# Patient Record
Sex: FEMALE | Race: BLACK OR AFRICAN AMERICAN | Employment: UNEMPLOYED | URBAN - METROPOLITAN AREA
[De-identification: names, ages, dates, MRNs, and addresses within clinical notes are randomized per-mention and may not be internally consistent; named-entity substitution may affect disease eponyms.]

---

## 2019-01-27 ENCOUNTER — HOSPITAL ENCOUNTER (EMERGENCY)
Age: 64
Discharge: HOME | End: 2019-01-27
Attending: EMERGENCY MEDICINE
Payer: MEDICAID

## 2019-01-27 VITALS
BODY MASS INDEX: 51.91 KG/M2 | HEIGHT: 63 IN | WEIGHT: 293 LBS | OXYGEN SATURATION: 99 % | TEMPERATURE: 97.7 F | HEART RATE: 89 BPM | DIASTOLIC BLOOD PRESSURE: 65 MMHG | RESPIRATION RATE: 22 BRPM | SYSTOLIC BLOOD PRESSURE: 160 MMHG

## 2019-01-27 DIAGNOSIS — R42 DIZZINESS: Primary | ICD-10-CM

## 2019-01-27 LAB
ALBUMIN SERPL-MCNC: 3.2 G/DL (ref 3.5–5)
ALBUMIN/GLOB SERPL: 0.6 {RATIO} (ref 1.1–2.2)
ALP SERPL-CCNC: 81 U/L (ref 45–117)
ALT SERPL-CCNC: 13 U/L (ref 12–78)
ANION GAP SERPL CALC-SCNC: 9 MMOL/L (ref 5–15)
APPEARANCE UR: CLEAR
AST SERPL-CCNC: 8 U/L (ref 15–37)
BACTERIA URNS QL MICRO: NEGATIVE /HPF
BASOPHILS # BLD: 0 K/UL (ref 0–0.1)
BASOPHILS NFR BLD: 1 % (ref 0–1)
BILIRUB SERPL-MCNC: 0.4 MG/DL (ref 0.2–1)
BILIRUB UR QL: NEGATIVE
BUN SERPL-MCNC: 10 MG/DL (ref 6–20)
BUN/CREAT SERPL: 14 (ref 12–20)
CALCIUM SERPL-MCNC: 8.9 MG/DL (ref 8.5–10.1)
CHLORIDE SERPL-SCNC: 101 MMOL/L (ref 97–108)
CK SERPL-CCNC: 32 U/L (ref 26–192)
CO2 SERPL-SCNC: 28 MMOL/L (ref 21–32)
COLOR UR: NORMAL
CREAT SERPL-MCNC: 0.74 MG/DL (ref 0.55–1.02)
DIFFERENTIAL METHOD BLD: ABNORMAL
EOSINOPHIL # BLD: 0.2 K/UL (ref 0–0.4)
EOSINOPHIL NFR BLD: 2 % (ref 0–7)
EPITH CASTS URNS QL MICRO: NORMAL /LPF
ERYTHROCYTE [DISTWIDTH] IN BLOOD BY AUTOMATED COUNT: 17.1 % (ref 11.5–14.5)
GLOBULIN SER CALC-MCNC: 5.4 G/DL (ref 2–4)
GLUCOSE SERPL-MCNC: 110 MG/DL (ref 65–100)
GLUCOSE UR STRIP.AUTO-MCNC: NEGATIVE MG/DL
HCT VFR BLD AUTO: 37.9 % (ref 35–47)
HGB BLD-MCNC: 12 G/DL (ref 11.5–16)
HGB UR QL STRIP: NEGATIVE
HYALINE CASTS URNS QL MICRO: NORMAL /LPF (ref 0–5)
IMM GRANULOCYTES # BLD AUTO: 0 K/UL (ref 0–0.04)
IMM GRANULOCYTES NFR BLD AUTO: 0 % (ref 0–0.5)
KETONES UR QL STRIP.AUTO: NEGATIVE MG/DL
LEUKOCYTE ESTERASE UR QL STRIP.AUTO: NEGATIVE
LYMPHOCYTES # BLD: 3.7 K/UL (ref 0.8–3.5)
LYMPHOCYTES NFR BLD: 47 % (ref 12–49)
MCH RBC QN AUTO: 25.4 PG (ref 26–34)
MCHC RBC AUTO-ENTMCNC: 31.7 G/DL (ref 30–36.5)
MCV RBC AUTO: 80.1 FL (ref 80–99)
MONOCYTES # BLD: 0.5 K/UL (ref 0–1)
MONOCYTES NFR BLD: 6 % (ref 5–13)
NEUTS SEG # BLD: 3.4 K/UL (ref 1.8–8)
NEUTS SEG NFR BLD: 44 % (ref 32–75)
NITRITE UR QL STRIP.AUTO: NEGATIVE
NRBC # BLD: 0 K/UL (ref 0–0.01)
NRBC BLD-RTO: 0 PER 100 WBC
PH UR STRIP: 6.5 [PH] (ref 5–8)
PLATELET # BLD AUTO: 317 K/UL (ref 150–400)
PMV BLD AUTO: 10.3 FL (ref 8.9–12.9)
POTASSIUM SERPL-SCNC: 3.8 MMOL/L (ref 3.5–5.1)
PROT SERPL-MCNC: 8.6 G/DL (ref 6.4–8.2)
PROT UR STRIP-MCNC: NEGATIVE MG/DL
RBC # BLD AUTO: 4.73 M/UL (ref 3.8–5.2)
RBC #/AREA URNS HPF: NORMAL /HPF (ref 0–5)
SODIUM SERPL-SCNC: 138 MMOL/L (ref 136–145)
SP GR UR REFRACTOMETRY: <1.005 (ref 1–1.03)
TROPONIN I SERPL-MCNC: <0.05 NG/ML
UA: UC IF INDICATED,UAUC: NORMAL
UROBILINOGEN UR QL STRIP.AUTO: 0.2 EU/DL (ref 0.2–1)
WBC # BLD AUTO: 7.9 K/UL (ref 3.6–11)
WBC URNS QL MICRO: NORMAL /HPF (ref 0–4)

## 2019-01-27 PROCEDURE — 85025 COMPLETE CBC W/AUTO DIFF WBC: CPT

## 2019-01-27 PROCEDURE — 99285 EMERGENCY DEPT VISIT HI MDM: CPT

## 2019-01-27 PROCEDURE — 84484 ASSAY OF TROPONIN QUANT: CPT

## 2019-01-27 PROCEDURE — 82550 ASSAY OF CK (CPK): CPT

## 2019-01-27 PROCEDURE — 81001 URINALYSIS AUTO W/SCOPE: CPT

## 2019-01-27 PROCEDURE — 80053 COMPREHEN METABOLIC PANEL: CPT

## 2019-01-27 PROCEDURE — 36415 COLL VENOUS BLD VENIPUNCTURE: CPT

## 2019-01-28 NOTE — ED TRIAGE NOTES
Assumed care of pt from EMS. Pt reports having diarrhea since yesterday and feeling like she was going to pass out today. Pt reports she drank 3 bottles of water today but she thinks she may dehydrated. PT reports no vomiting and no chest pain.

## 2019-01-28 NOTE — ED NOTES
Dr. Sugey Phoenix reviewed discharge instructions with the patient. The patient verbalized understanding. All questions and concerns were addressed. The patient declined a wheelchair and is discharged ambulatory in the care of family members with instructions and prescriptions in hand. Pt is alert and oriented x 4. Respirations are clear and unlabored.

## 2019-01-28 NOTE — ED NOTES
Dr. Jennifer Lanes reviewed discharge instructions with the patient. The patient verbalized understanding. All questions and concerns were addressed. The patient declined a wheelchair and is discharged ambulatory in the care of family members with instructions and prescriptions in hand. Pt is alert and oriented x 4. Respirations are clear and unlabored.

## 2019-01-28 NOTE — ED PROVIDER NOTES
EMERGENCY DEPARTMENT HISTORY AND PHYSICAL EXAM 
 
 
Date: 1/27/2019 Patient Name: Celine Clark History of Presenting Illness Chief Complaint Patient presents with  Dizziness  Diarrhea History Provided By: Patient HPI: Celine Clark, 66 y.o. female with PMHx significant for HTN, asthma, arthritis, presents via EMS to the ED with cc of improving lightheadedness, diarrhea, and urinary frequency onset yesterday. Pt reports that for the past few days, her BP has been elevated. She reports being dx with URI in late December and was given a 5 day course of abx which she finished; however, her cough has persisted. She reports that today, she had near syncope. She denies CP, SOB, abd pain or BLE swelling. She denies hx of DM. She denies any alleviating factors. There are no other complaints, changes, or physical findings at this time. PCP: Gabriel Weinstein MD 
 
Social Hx:  
Social History Tobacco Use Smoking Status Former Smoker Smokeless Tobacco Never Used Tobacco Comment  
 quit smoking at age 34  
, Social History Substance and Sexual Activity Alcohol Use No  
,  
Social History Substance and Sexual Activity Drug Use No  
 Comment: denies Past History Past Surgical History: No past surgical history on file. Family History: 
Family History Problem Relation Age of Onset  Asthma Mother  Hypertension Mother  High Cholesterol Mother  Emphysema Brother  Heart Failure Brother  Colon Cancer Brother  Hypertension Brother  Lung Disease Brother   
     copd  Heart Disease Brother   
     chf  
 Diabetes Maternal Uncle  Hypertension Maternal Uncle  Diabetes Paternal Aunt  Hypertension Paternal Uncle  High Cholesterol Paternal Uncle  Colon Cancer Maternal Grandmother  Hypertension Maternal Grandfather Allergies: Allergies Allergen Reactions  Latex Rash and Itching [address]  Ibuprofen Hives  Pcn [Penicillins] Hives  Shellfish Derived Hives  Solu-Medrol W/Diluent Hives  Sulfa Dyne Hives Review of Systems Review of Systems Constitutional: Negative for fatigue and fever. HENT: Negative. Eyes: Negative. Respiratory: Positive for cough. Negative for shortness of breath and wheezing. Cardiovascular: Negative for chest pain and leg swelling. Gastrointestinal: Positive for diarrhea. Negative for blood in stool, constipation, nausea and vomiting. Endocrine: Negative. Genitourinary: Positive for frequency. Negative for difficulty urinating and dysuria. Musculoskeletal: Negative. Skin: Negative for rash. Allergic/Immunologic: Negative. Neurological: Positive for syncope (near) and light-headedness. Negative for weakness and numbness. Hematological: Negative. Psychiatric/Behavioral: Negative. Physical Exam  
Physical Exam  
Constitutional: She is oriented to person, place, and time. She appears well-developed and well-nourished. No distress. Elevated BMI HENT:  
Head: Normocephalic and atraumatic. Mouth/Throat: Oropharynx is clear and moist.  
Eyes: Conjunctivae and EOM are normal.  
Neck: Neck supple. No JVD present. No tracheal deviation present. Cardiovascular: Regular rhythm and intact distal pulses. Tachycardia present. Exam reveals no gallop and no friction rub. No murmur heard. Pulmonary/Chest: Effort normal and breath sounds normal. No stridor. No respiratory distress. She has no wheezes. Abdominal: Soft. Bowel sounds are normal. She exhibits no distension and no mass. There is no tenderness. There is no guarding. Musculoskeletal: Normal range of motion. She exhibits no edema or tenderness. No deformity. No peripheral edema. Neurological: She is alert and oriented to person, place, and time. She has normal strength. No focal deficits Skin: Skin is warm, dry and intact. No rash noted. Psychiatric: She has a normal mood and affect. Her behavior is normal. Judgment and thought content normal.  
Nursing note and vitals reviewed. Diagnostic Study Results Labs - Recent Results (from the past 12 hour(s)) CBC WITH AUTOMATED DIFF Collection Time: 01/27/19  7:40 PM  
Result Value Ref Range WBC 7.9 3.6 - 11.0 K/uL  
 RBC 4.73 3.80 - 5.20 M/uL  
 HGB 12.0 11.5 - 16.0 g/dL HCT 37.9 35.0 - 47.0 % MCV 80.1 80.0 - 99.0 FL  
 MCH 25.4 (L) 26.0 - 34.0 PG  
 MCHC 31.7 30.0 - 36.5 g/dL  
 RDW 17.1 (H) 11.5 - 14.5 % PLATELET 045 378 - 685 K/uL MPV 10.3 8.9 - 12.9 FL  
 NRBC 0.0 0  WBC ABSOLUTE NRBC 0.00 0.00 - 0.01 K/uL NEUTROPHILS 44 32 - 75 % LYMPHOCYTES 47 12 - 49 % MONOCYTES 6 5 - 13 % EOSINOPHILS 2 0 - 7 % BASOPHILS 1 0 - 1 % IMMATURE GRANULOCYTES 0 0.0 - 0.5 % ABS. NEUTROPHILS 3.4 1.8 - 8.0 K/UL  
 ABS. LYMPHOCYTES 3.7 (H) 0.8 - 3.5 K/UL  
 ABS. MONOCYTES 0.5 0.0 - 1.0 K/UL  
 ABS. EOSINOPHILS 0.2 0.0 - 0.4 K/UL  
 ABS. BASOPHILS 0.0 0.0 - 0.1 K/UL  
 ABS. IMM. GRANS. 0.0 0.00 - 0.04 K/UL  
 DF AUTOMATED    
TROPONIN I Collection Time: 01/27/19  7:40 PM  
Result Value Ref Range Troponin-I, Qt. <0.05 <0.05 ng/mL CK W/ REFLX CKMB Collection Time: 01/27/19  7:40 PM  
Result Value Ref Range CK 32 26 - 331 U/L  
METABOLIC PANEL, COMPREHENSIVE Collection Time: 01/27/19  7:40 PM  
Result Value Ref Range Sodium 138 136 - 145 mmol/L Potassium 3.8 3.5 - 5.1 mmol/L Chloride 101 97 - 108 mmol/L  
 CO2 28 21 - 32 mmol/L Anion gap 9 5 - 15 mmol/L Glucose 110 (H) 65 - 100 mg/dL BUN 10 6 - 20 MG/DL Creatinine 0.74 0.55 - 1.02 MG/DL  
 BUN/Creatinine ratio 14 12 - 20 GFR est AA >60 >60 ml/min/1.73m2 GFR est non-AA >60 >60 ml/min/1.73m2 Calcium 8.9 8.5 - 10.1 MG/DL  Bilirubin, total 0.4 0.2 - 1.0 MG/DL  
 ALT (SGPT) 13 12 - 78 U/L  
 AST (SGOT) 8 (L) 15 - 37 U/L Alk. phosphatase 81 45 - 117 U/L Protein, total 8.6 (H) 6.4 - 8.2 g/dL Albumin 3.2 (L) 3.5 - 5.0 g/dL Globulin 5.4 (H) 2.0 - 4.0 g/dL A-G Ratio 0.6 (L) 1.1 - 2.2 URINALYSIS W/ REFLEX CULTURE Collection Time: 01/27/19  8:26 PM  
Result Value Ref Range Color YELLOW/STRAW Appearance CLEAR CLEAR Specific gravity <1.005 1.003 - 1.030  
 pH (UA) 6.5 5.0 - 8.0 Protein NEGATIVE  NEG mg/dL Glucose NEGATIVE  NEG mg/dL Ketone NEGATIVE  NEG mg/dL Bilirubin NEGATIVE  NEG Blood NEGATIVE  NEG Urobilinogen 0.2 0.2 - 1.0 EU/dL Nitrites NEGATIVE  NEG Leukocyte Esterase NEGATIVE  NEG    
 WBC 0-4 0 - 4 /hpf  
 RBC 0-5 0 - 5 /hpf Epithelial cells FEW FEW /lpf Bacteria NEGATIVE  NEG /hpf  
 UA:UC IF INDICATED CULTURE NOT INDICATED BY UA RESULT CNI Hyaline cast 0-2 0 - 5 /lpf Radiologic Studies - No orders to display CT Results  (Last 48 hours) None CXR Results  (Last 48 hours) None Medical Decision Making I am the first provider for this patient. I reviewed the vital signs, available nursing notes, past medical history, past surgical history, family history and social history. Vital Signs-Reviewed the patient's vital signs. Patient Vitals for the past 12 hrs: 
 Temp Pulse Resp BP SpO2  
01/27/19 2100  89 22 160/65 99 % 01/27/19 2045  94 21 159/76 100 % 01/27/19 2030  94 21 158/76 99 % 01/27/19 2019  (!) 107 27 160/88 99 % 01/27/19 2018  (!) 101 23 160/88 100 % 01/27/19 1937 97.7 °F (36.5 °C) (!) 115 26 (!) 173/107 99 % Pulse Oximetry Analysis - 99% on RA Records Reviewed: Nursing Notes, Old Medical Records, Previous Radiology Studies and Previous Laboratory Studies Provider Notes (Medical Decision Making): Pt presenting with transient episode of lightheadedness that is now resolved. Pt had high BP at home but has now normalized.  Pt has no complaints. Will check labs. DDx: UA, ACS, arrhythmia, anemia, metabolic abnormality, dehydration, NAWAF. ED Course:  
Initial assessment performed. The patients presenting problems have been discussed, and they are in agreement with the care plan formulated and outlined with them. I have encouraged them to ask questions as they arise throughout their visit. Critical Care Time:  
0 minutes. Disposition: 
Discharge Note: 
9:45 PM 
The patient has been re-evaluated and is ready for discharge. Reviewed available results with patient. Counseled patient/parent/guardian on diagnosis and care plan. Patient has expressed understanding, and all questions have been answered. Patient agrees with plan and agrees to follow up as recommended, or return to the ED if their symptoms worsen. Discharge instructions have been provided and explained to the patient, along with reasons to return to the ED. PLAN: 
1. Current Discharge Medication List  
  
 
2. Follow-up Information Follow up With Specialties Details Why Contact Info Amairani Harris MD Family [facility] Schedule an appointment as soon as possible for a visit  [address] [phone] 
[phone] [facility] EMERGENCY DEPT Emergency Medicine  As needed, If symptoms worsen [address] 
[phone] Return to ED if worse Diagnosis Clinical Impression: 1. Dizziness Attestations: This note is prepared by Genesis Lee, acting as scribe for DO Wilfredo Nicole DO: The scribe's documentation has been prepared under my direction and personally reviewed by me in its entirety. I confirm that the note above accurately reflects all work, treatment, procedures, and medical decision making performed by me.

## 2019-01-28 NOTE — DISCHARGE INSTRUCTIONS

## 2019-02-21 ENCOUNTER — HOSPITAL ENCOUNTER (EMERGENCY)
Age: 64
Discharge: HOME | End: 2019-02-22
Attending: EMERGENCY MEDICINE
Payer: MEDICAID

## 2019-02-21 VITALS
HEART RATE: 110 BPM | TEMPERATURE: 98 F | SYSTOLIC BLOOD PRESSURE: 169 MMHG | BODY MASS INDEX: 51.91 KG/M2 | OXYGEN SATURATION: 100 % | WEIGHT: 293 LBS | DIASTOLIC BLOOD PRESSURE: 90 MMHG | RESPIRATION RATE: 16 BRPM | HEIGHT: 63 IN

## 2019-02-21 DIAGNOSIS — I10 ACCELERATED HYPERTENSION: ICD-10-CM

## 2019-02-21 DIAGNOSIS — T18.9XXA SWALLOWED FOREIGN BODY, INITIAL ENCOUNTER: Primary | ICD-10-CM

## 2019-02-21 DIAGNOSIS — R13.19 ESOPHAGEAL DYSPHAGIA: ICD-10-CM

## 2019-02-21 PROCEDURE — 74011250637 HC RX REV CODE- 250/637: Performed by: STUDENT IN AN ORGANIZED HEALTH CARE EDUCATION/TRAINING PROGRAM

## 2019-02-21 PROCEDURE — 99283 EMERGENCY DEPT VISIT LOW MDM: CPT

## 2019-02-21 PROCEDURE — 74011000250 HC RX REV CODE- 250: Performed by: STUDENT IN AN ORGANIZED HEALTH CARE EDUCATION/TRAINING PROGRAM

## 2019-02-21 RX ORDER — MAG HYDROX/ALUMINUM HYD/SIMETH 200-200-20
30 SUSPENSION, ORAL (FINAL DOSE FORM) ORAL
Qty: 30 ML | Refills: 0 | Status: SHIPPED | OUTPATIENT
Start: 2019-02-21

## 2019-02-21 RX ADMIN — LIDOCAINE HYDROCHLORIDE 40 ML: 20 SOLUTION ORAL; TOPICAL at 23:14

## 2019-02-22 NOTE — ED NOTES
Dr. Romo Homes reviewed discharge instructions with the patient. The patient verbalized understanding. All questions and concerns were addressed. The patient declined a wheelchair and is discharged ambulatory in the care of family members with instructions and prescriptions in hand. Pt is alert and oriented x 4. Respirations are clear and unlabored.

## 2019-02-22 NOTE — ED PROVIDER NOTES
EMERGENCY DEPARTMENT HISTORY AND PHYSICAL EXAM 
 
 
Date: 2/21/2019 Patient Name: Nathan Parsons History of Presenting Illness Chief Complaint Patient presents with  Airway obstruction History Provided By: Patient and Patient's  HPI: Nathan Parsons, 66 y.o. female with PMHx significant for arthritis, asthma, HTN, morbid obesity, presents ambulatory to the ED with cc of swallowing a piece of fork. Patient states just PTA, she accidentally bit off the prong on a plastic fork and swallowed it. States right after she felt a scratchy feeling in her throat, so she presented to the ED. Denies SOB. States she definitely feels like she swallowed it. Denies hemoptysis. States she feels the need to cough because of the scratchiness in her throat. There are no other complaints, changes, or physical findings at this time. PCP: Ronda Byrd NP No current facility-administered medications on file prior to encounter. Current Outpatient Medications on File Prior to Encounter Medication Sig Dispense Refill  montelukast (SINGULAIR) 10 mg tablet TAKE 1 TABLET BY MOUTH EVERY DAY 30 Tab 9  
 valsartan (DIOVAN) 320 mg tablet TAKE 1 TABLET BY MOUTH EVERY DAY 90 Tab 2  
 valsartan (DIOVAN) 320 mg tablet TAKE 1 TABLET BY MOUTH EVERY DAY 90 Tab 2  
 montelukast (SINGULAIR) 10 mg tablet TAKE 1 TABLET BY MOUTH EVERY DAY 30 Tab 9  
 PROAIR HFA 90 mcg/actuation inhaler INHALE 2 PUFFS BY MOUTH EVERY 6 HOURS AS NEEDED 1 Inhaler 12  
 valsartan (DIOVAN) 320 mg tablet TAKE 1 TABLET BY MOUTH EVERY DAY 90 Tab 3  
 valsartan (DIOVAN) 320 mg tablet TAKE 1 TABLET BY MOUTH EVERY DAY 90 Tab 3  
 amLODIPine-benazepril (LOTREL) 10-20 mg per capsule Take 1 capsule by mouth daily. 90 capsule 1  
 levofloxacin (LEVAQUIN) 750 mg tablet Take 1 Tab by mouth daily. 10 Tab 0  
 meclizine (ANTIVERT) 25 mg tablet Take 1 Tab by mouth three (3) times daily as needed for Dizziness.  20 Tab 0  
  ondansetron (ZOFRAN ODT) 4 mg disintegrating tablet Take 1 Tab by mouth every eight (8) hours as needed for Nausea. 10 Tab 0  chlorpheniramine-HYDROcodone (TUSSIONEX) 8-10 mg/5 mL suspension TAKE 5 MLS EVERY 12 HOURS AS NEEDED FOR COUGH 60 mL 0  
 albuterol (PROVENTIL VENTOLIN) 2.5 mg /3 mL (0.083 %) nebulizer solution Use 1 vial via nebulizer 1 Package 0  
 acetaminophen-codeine (TYLENOL-CODEINE #3) 300-30 mg per tablet Take 1 Tab by mouth every six (6) hours as needed for Pain. 90 Tab 2  
 albuterol (PROVENTIL HFA, VENTOLIN HFA) 90 mcg/actuation inhaler Take 2 Puffs by inhalation every six (6) hours as needed. 1 Inhaler 0  Bedside Commode XX Used as needed 1 Each 0  
 beclomethasone (QVAR) 40 mcg/actuation inhaler Take 1 Puff by inhalation two (2) times a day. 1 Inhaler 6  Inhalational Spacing Device (SIDESTREAM PEDIATRIC FACE MASK) Kit 1 Units by Does Not Apply route as needed. 1 Units 0 Past History Past Medical History: 
Past Medical History:  
Diagnosis Date  Arthritis  Asthma  Chronic pain   
 knees & hips  Hypertension  Unspecified adverse effect of anesthesia 1st cousin had severe HTN and difficulty breathing  Unspecified sleep apnea   
 does not use c-pap- to be retested Past Surgical History: No past surgical history on file. Family History: 
Family History Problem Relation Age of Onset  Asthma Mother  Hypertension Mother  High Cholesterol Mother  Emphysema Brother  Heart Failure Brother  Colon Cancer Brother  Hypertension Brother  Lung Disease Brother   
     copd  Heart Disease Brother   
     chf  
 Diabetes Maternal Uncle  Hypertension Maternal Uncle  Diabetes Paternal Aunt  Hypertension Paternal Uncle  High Cholesterol Paternal Uncle  Colon Cancer Maternal Grandmother  Hypertension Maternal Grandfather Social History: 
Social History Tobacco Use  
  Smoking status: Former Smoker  Smokeless tobacco: Never Used  Tobacco comment: quit smoking at age 34 Substance Use Topics  Alcohol use: No  
 Drug use: No  
  Comment: denies Allergies: Allergies Allergen Reactions  Latex Rash and Itching [address]  Ibuprofen Hives  Pcn [Penicillins] Hives  Shellfish Derived Hives  Solu-Medrol W/Diluent Hives  Sulfa Dyne Hives Review of Systems Review of Systems Constitutional: Negative for chills and fever. HENT: Negative for congestion, postnasal drip, rhinorrhea and voice change. Eyes: Negative for visual disturbance. Respiratory: Positive for cough. Negative for chest tightness and shortness of breath. Cardiovascular: Negative for chest pain, palpitations and leg swelling. Gastrointestinal: Negative for abdominal distention, abdominal pain, diarrhea, nausea and vomiting. Genitourinary: Negative for dysuria, frequency, hematuria and urgency. Musculoskeletal: Negative for arthralgias, myalgias and neck pain. Skin: Negative for color change and rash. Neurological: Negative for dizziness, syncope, numbness and headaches. Psychiatric/Behavioral: Negative for agitation. Physical Exam  
Physical Exam  
Constitutional: She is oriented to person, place, and time. She appears well-developed and well-nourished. No distress. HENT:  
Head: Normocephalic and atraumatic. Mouth/Throat: Oropharynx is clear and moist.  
Eyes: Conjunctivae and EOM are normal. Pupils are equal, round, and reactive to light. Neck: Normal range of motion. Cardiovascular: Regular rhythm and normal heart sounds. Tachycardia present. Exam reveals no gallop and no friction rub. No murmur heard. Pulmonary/Chest: Effort normal and breath sounds normal. No respiratory distress. She has no wheezes. She has no rales. She exhibits no tenderness. Abdominal: Soft. Bowel sounds are normal. There is no tenderness. There is no rebound and no guarding. Musculoskeletal: Normal range of motion. She exhibits no edema, tenderness or deformity. Neurological: She is alert and oriented to person, place, and time. Skin: Skin is warm and dry. No rash noted. Psychiatric: She has a normal mood and affect. Diagnostic Study Results Labs - No results found for this or any previous visit (from the past 12 hour(s)). Radiologic Studies - No orders to display CT Results  (Last 48 hours) None CXR Results  (Last 48 hours) None Medical Decision Making I am the first provider for this patient. I reviewed the vital signs, available nursing notes, past medical history, past surgical history, family history and social history. Vital Signs-Reviewed the patient's vital signs. Patient Vitals for the past 12 hrs: 
 Temp Pulse Resp BP SpO2  
02/21/19 2236 98 °F (36.7 °C) (!) 110 16 169/90 100 % Pulse Oximetry Analysis - 100% on RA Cardiac Monitor:  
Rate: 110 bpm 
Rhythm: Normal Sinus Rhythm Records Reviewed: Nursing Notes and Old Medical Records Provider Notes (Medical Decision Making):  
Patient is a 63yo female who presents to the ED after swallowing the prong off a plastic fork. Patient appears well with no respiratory distress, do not believe imaging required at this time. Believe this should pass on its own. Will try GI cocktail for scratchy throat and discharge home. ED Course:  
Initial assessment performed. The patients presenting problems have been discussed, and they are in agreement with the care plan formulated and outlined with them. I have encouraged them to ask questions as they arise throughout their visit. Disposition: Foreign Body Ingestion, Improved PLAN: 
1. Discharge home Discharge Medication List as of 2/21/2019 11:32 PM  
  
START taking these medications Details alum-mag hydroxide-simeth (MYLANTA) 200-200-20 mg/5 mL susp Take 30 mL by mouth every four (4) hours as needed., Normal, Disp-30 mL, R-0  
  
  
CONTINUE these medications which have NOT CHANGED Details  
!! montelukast (SINGULAIR) 10 mg tablet TAKE 1 TABLET BY MOUTH EVERY DAY, Normal, Disp-30 Tab, R-9  
  
!! valsartan (DIOVAN) 320 mg tablet TAKE 1 TABLET BY MOUTH EVERY DAY, Normal, Disp-90 Tab, R-2  
  
!! valsartan (DIOVAN) 320 mg tablet TAKE 1 TABLET BY MOUTH EVERY DAY, Normal, Disp-90 Tab, R-2  
  
!! montelukast (SINGULAIR) 10 mg tablet TAKE 1 TABLET BY MOUTH EVERY DAY, Normal, Disp-30 Tab, R-9  
  
!! PROAIR HFA 90 mcg/actuation inhaler INHALE 2 PUFFS BY MOUTH EVERY 6 HOURS AS NEEDED, Normal, Disp-1 Inhaler, R-12  
  
!! valsartan (DIOVAN) 320 mg tablet TAKE 1 TABLET BY MOUTH EVERY DAY, Normal, Disp-90 Tab, R-3  
  
!! valsartan (DIOVAN) 320 mg tablet TAKE 1 TABLET BY MOUTH EVERY DAY, Normal, Disp-90 Tab, R-3  
  
amLODIPine-benazepril (LOTREL) 10-20 mg per capsule Take 1 capsule by mouth daily. , Normal, Disp-90 capsule, R-1  
  
levofloxacin (LEVAQUIN) 750 mg tablet Take 1 Tab by mouth daily. , Print, Disp-10 Tab, R-0  
  
meclizine (ANTIVERT) 25 mg tablet Take 1 Tab by mouth three (3) times daily as needed for Dizziness. , Print, Disp-20 Tab, R-0  
  
ondansetron (ZOFRAN ODT) 4 mg disintegrating tablet Take 1 Tab by mouth every eight (8) hours as needed for Nausea. , Print, Disp-10 Tab, R-0  
  
chlorpheniramine-HYDROcodone (TUSSIONEX) 8-10 mg/5 mL suspension TAKE 5 MLS EVERY 12 HOURS AS NEEDED FOR COUGH, Print, Disp-60 mL, R-0  
  
albuterol (PROVENTIL VENTOLIN) 2.5 mg /3 mL (0.083 %) nebulizer solution Use 1 vial via nebulizer, No Print, Disp-1 Package, R-0  
  
acetaminophen-codeine (TYLENOL-CODEINE #3) 300-30 mg per tablet Take 1 Tab by mouth every six (6) hours as needed for Pain., Print, Disp-90 Tab, R-2  
  
!! albuterol (PROVENTIL HFA, VENTOLIN HFA) 90 mcg/actuation inhaler Take 2 Puffs by inhalation every six (6) hours as needed., Normal, Disp-1 Inhaler, R-0 Bedside Commode XX Used as needed, Print, Disp-1 Each, R-0  
  
beclomethasone (QVAR) 40 mcg/actuation inhaler Take 1 Puff by inhalation two (2) times a day. Normal, 1 Puff, Disp-1 Inhaler, R-6 Inhalational Spacing Device (SIDESTREAM PEDIATRIC FACE MASK) Kit 1 Units by Does Not Apply route as needed. Print, 1 Units, Disp-1 Units, R-0  
  
 !! - Potential duplicate medications found. Please discuss with provider. 2.  
Follow-up Information Follow up With Specialties Details Why Contact Info Bard Yaya NP Nurse Practitioner In 1 week As needed, If symptoms worsen [address] [address], [facility] 
[phone] [facility] EMERGENCY DEPT Emergency Medicine In 1 day As needed, If symptoms worsen [address] [facility] 
[phone] Return to ED if worse Diagnosis Clinical Impression: 1. Swallowed foreign body, initial encounter 2. Esophageal dysphagia 3. Accelerated hypertension Attestations: 
Attending [place], 66 y.o. female with PMHx significant for arthritis, asthma, HTN, morbid obesity, presents ambulatory to the ED with cc of swallowing a piece of fork. Patient states just PTA, she accidentally bit off the prong on a plastic fork and swallowed it. General: NAD HEENT: EOMI, non-icteric sclera Chest: RRR, no m/r/g Lungs: CTAB Abd: soft, nondistended, nontender, no CVAT, +bs. No guarding or rebound. Ext: no peripheral edema, no cyanosis, +2 peripheral pulses Skin: no rashes or lesions Neuro: no focal deficits Impression/Plan: 
66 y.o. female presenting with swallowed foreign body (small piece of plastic fork), pt well appearing, no resp distress, tolerating secretions and able to tolerate PO in ED without issues; will dc home with symptomatic treatment and GI referral. 
 
HYPERTENSION COUNSELING 
 Education was provided to the patient today regarding their hypertension. Patient is made aware of their elevated blood pressure and is instructed to follow up this week with their Primary Care for a recheck. Patient is counseled regarding consequences of chronic, uncontrolled hypertension including kidney disease, heart disease, stroke or even death. Patient states their understanding and agrees to follow up this week. Additionally, during their visit, I discussed sodium restriction, maintaining ideal body weight and regular exercise program as physiologic means to achieve blood pressure control. The patient will strive towards this.  
  
I have personally seen and examined the patient; I have reviewed the resident's note and agree with findings and plan. I was present during the key portions of separately billed procedures and involved in all portions of critical care management. Yulissa Samuel MD 
 
 
This note will not be viewable in [address]

## 2019-02-22 NOTE — DISCHARGE INSTRUCTIONS
Patient Education        Nontoxic Ingestion: Care Instructions  Your Care Instructions  You swallowed something that is not a poison, or toxin. In most cases this will not cause problems. Your body will pass what you ate or drank. Drinking plenty of fluids and eating foods with fiber will help. Liquid charcoal may be given to a person who has swallowed a nontoxic substance. If you were treated with liquid charcoal, expect your stools to be black for a couple of days. The doctor may give you instructions for how to treat other side effects of charcoal, such as nausea and constipation. The doctor has checked you carefully. But problems can develop later. If you notice any problems or new symptoms, get medical treatment right away. Follow-up care is a key part of your treatment and safety. Be sure to make and go to all appointments, and call your doctor if you are having problems. It's also a good idea to know your test results and keep a list of the medicines you take. How can you care for yourself at home? · Follow your doctor's instructions about closely watching your health and behavior. · Drink plenty of fluids. If you have kidney, heart, or liver disease and have to limit fluids, talk with your doctor before you increase the amount of fluids you drink. · Include fruits, vegetables, beans, and whole grains in your diet each day. These foods are high in fiber. · If your doctor tells you to, take a fiber supplement, such as Citrucel or Metamucil, every day if needed. Read and follow all instructions on the label. · If liquid charcoal causes constipation, talk to your doctor about how to treat it. When should you call for help? Call 911 anytime you think you may need emergency care.  For example, call if:    · You passed out (lost consciousness).     · You are confused or can't think clearly.    Watch closely for changes in your health, and be sure to contact your doctor if:    · You do not get better as expected. Where can you learn more? Go to http://anne-marie-abdirahman.info/. Enter I479 in the search box to learn more about \"Nontoxic Ingestion: Care Instructions. \"  Current as of: March 27, 2018  Content Version: 11.9  © 6952-9853 [employer]. Care instructions adapted under license by [employer] (which disclaims liability or warranty for this information). If you have questions about a medical condition or this instruction, always ask your healthcare professional. [address] any warranty or liability for your use of this information.

## 2019-02-22 NOTE — ED TRIAGE NOTES
Assumed care of pt from EMS. Pt reports when she was eating around 3 hours ago that a plastic fork broke when she was eating. Pt reports the fork is stuck in her throat. Dr. Kobe Caceres at bedside. Pt reports no trouble breathing and no shortness of breath.

## 2019-04-02 ENCOUNTER — APPOINTMENT (OUTPATIENT)
Dept: GENERAL RADIOLOGY | Age: 64
End: 2019-04-02
Attending: EMERGENCY MEDICINE
Payer: MEDICAID

## 2019-04-02 ENCOUNTER — HOSPITAL ENCOUNTER (EMERGENCY)
Age: 64
Discharge: HOME | End: 2019-04-02
Attending: EMERGENCY MEDICINE
Payer: MEDICAID

## 2019-04-02 VITALS
HEIGHT: 63 IN | HEART RATE: 82 BPM | OXYGEN SATURATION: 100 % | WEIGHT: 293 LBS | BODY MASS INDEX: 51.91 KG/M2 | SYSTOLIC BLOOD PRESSURE: 180 MMHG | TEMPERATURE: 97.9 F | DIASTOLIC BLOOD PRESSURE: 85 MMHG | RESPIRATION RATE: 20 BRPM

## 2019-04-02 DIAGNOSIS — J20.9 ACUTE BRONCHITIS, UNSPECIFIED ORGANISM: ICD-10-CM

## 2019-04-02 DIAGNOSIS — J45.21 MILD INTERMITTENT ASTHMA WITH ACUTE EXACERBATION: Primary | ICD-10-CM

## 2019-04-02 PROCEDURE — 77030029684 HC NEB SM VOL KT MONA -A

## 2019-04-02 PROCEDURE — 71046 X-RAY EXAM CHEST 2 VIEWS: CPT

## 2019-04-02 PROCEDURE — 74011000250 HC RX REV CODE- 250: Performed by: PHYSICIAN ASSISTANT

## 2019-04-02 PROCEDURE — 94640 AIRWAY INHALATION TREATMENT: CPT

## 2019-04-02 PROCEDURE — 99283 EMERGENCY DEPT VISIT LOW MDM: CPT

## 2019-04-02 RX ORDER — AZITHROMYCIN 250 MG/1
TABLET, FILM COATED ORAL
Qty: 6 TAB | Refills: 0 | Status: SHIPPED | OUTPATIENT
Start: 2019-04-02 | End: 2019-04-07

## 2019-04-02 RX ORDER — PREDNISONE 20 MG/1
40 TABLET ORAL DAILY
Qty: 8 TAB | Refills: 0 | Status: SHIPPED | OUTPATIENT
Start: 2019-04-02 | End: 2019-04-06

## 2019-04-02 RX ORDER — IPRATROPIUM BROMIDE AND ALBUTEROL SULFATE 2.5; .5 MG/3ML; MG/3ML
3 SOLUTION RESPIRATORY (INHALATION)
Status: COMPLETED | OUTPATIENT
Start: 2019-04-02 | End: 2019-04-02

## 2019-04-02 RX ADMIN — IPRATROPIUM BROMIDE AND ALBUTEROL SULFATE 3 ML: .5; 3 SOLUTION RESPIRATORY (INHALATION) at 22:55

## 2019-04-03 ENCOUNTER — HOSPITAL ENCOUNTER (EMERGENCY)
Age: 64
Discharge: HOME | End: 2019-04-03
Attending: EMERGENCY MEDICINE
Payer: MEDICAID

## 2019-04-03 VITALS
WEIGHT: 293 LBS | HEIGHT: 63 IN | TEMPERATURE: 97.5 F | BODY MASS INDEX: 51.91 KG/M2 | HEART RATE: 98 BPM | OXYGEN SATURATION: 99 % | SYSTOLIC BLOOD PRESSURE: 154 MMHG | RESPIRATION RATE: 16 BRPM | DIASTOLIC BLOOD PRESSURE: 80 MMHG

## 2019-04-03 DIAGNOSIS — T78.1XXA ALLERGIC REACTION TO FOOD, INITIAL ENCOUNTER: Primary | ICD-10-CM

## 2019-04-03 PROCEDURE — 94640 AIRWAY INHALATION TREATMENT: CPT

## 2019-04-03 PROCEDURE — 77030029684 HC NEB SM VOL KT MONA -A

## 2019-04-03 PROCEDURE — 99283 EMERGENCY DEPT VISIT LOW MDM: CPT

## 2019-04-03 PROCEDURE — 74011250637 HC RX REV CODE- 250/637: Performed by: PHYSICIAN ASSISTANT

## 2019-04-03 PROCEDURE — 74011000250 HC RX REV CODE- 250: Performed by: PHYSICIAN ASSISTANT

## 2019-04-03 RX ORDER — FAMOTIDINE 20 MG/1
20 TABLET, FILM COATED ORAL
Status: DISCONTINUED | OUTPATIENT
Start: 2019-04-03 | End: 2019-04-03 | Stop reason: SDUPTHER

## 2019-04-03 RX ORDER — DIPHENHYDRAMINE HCL 25 MG
25 CAPSULE ORAL
Status: COMPLETED | OUTPATIENT
Start: 2019-04-03 | End: 2019-04-03

## 2019-04-03 RX ORDER — DIPHENHYDRAMINE HCL 25 MG
25-50 TABLET ORAL
Qty: 30 TAB | Refills: 0 | Status: SHIPPED | OUTPATIENT
Start: 2019-04-03 | End: 2019-06-17

## 2019-04-03 RX ORDER — FAMOTIDINE 20 MG/1
20 TABLET, FILM COATED ORAL 2 TIMES DAILY
Qty: 20 TAB | Refills: 0 | Status: SHIPPED | OUTPATIENT
Start: 2019-04-03

## 2019-04-03 RX ORDER — FAMOTIDINE 20 MG/1
20 TABLET, FILM COATED ORAL
Status: COMPLETED | OUTPATIENT
Start: 2019-04-03 | End: 2019-04-03

## 2019-04-03 RX ORDER — DIPHENHYDRAMINE HYDROCHLORIDE 50 MG/ML
50 INJECTION, SOLUTION INTRAMUSCULAR; INTRAVENOUS
Status: DISCONTINUED | OUTPATIENT
Start: 2019-04-03 | End: 2019-04-03

## 2019-04-03 RX ORDER — IPRATROPIUM BROMIDE AND ALBUTEROL SULFATE 2.5; .5 MG/3ML; MG/3ML
3 SOLUTION RESPIRATORY (INHALATION)
Status: COMPLETED | OUTPATIENT
Start: 2019-04-03 | End: 2019-04-03

## 2019-04-03 RX ADMIN — FAMOTIDINE 20 MG: 20 TABLET ORAL at 20:07

## 2019-04-03 RX ADMIN — DIPHENHYDRAMINE HYDROCHLORIDE 25 MG: 25 CAPSULE ORAL at 20:07

## 2019-04-03 RX ADMIN — IPRATROPIUM BROMIDE AND ALBUTEROL SULFATE 3 ML: .5; 3 SOLUTION RESPIRATORY (INHALATION) at 20:07

## 2019-04-03 NOTE — ED PROVIDER NOTES
EMERGENCY DEPARTMENT HISTORY AND PHYSICAL EXAM 
 
 
Date: 4/2/2019 Patient Name: Miki Borjas History of Presenting Illness Chief Complaint Patient presents with  Shortness of Breath  
  hx of asthma - took medications PTA with no relief - resting in on wheelchair with no distress at this time History Provided By: Patient HPI: Miki Borjas, 66 y.o. female with PMHx significant for obesity, asthma, presents via EMS to the ED with cc of shortness of breath. About 5 days ago, the patient developed cough and congestion. Her symptoms have gradually worsened and she began feeling short of breath about 2 days ago. She has been using her albuterol inhaler without relief. She reports a fever up to 100.9 °F at home. She denies chest pain. She denies abdominal pain, vomiting, diarrhea. There are no other complaints, changes, or physical findings at this time. PCP: Laney Merino NP No current facility-administered medications on file prior to encounter. Current Outpatient Medications on File Prior to Encounter Medication Sig Dispense Refill  alum-mag hydroxide-simeth (MYLANTA) 200-200-20 mg/5 mL susp Take 30 mL by mouth every four (4) hours as needed. 30 mL 0  
 montelukast (SINGULAIR) 10 mg tablet TAKE 1 TABLET BY MOUTH EVERY DAY 30 Tab 9  
 valsartan (DIOVAN) 320 mg tablet TAKE 1 TABLET BY MOUTH EVERY DAY 90 Tab 2  
 valsartan (DIOVAN) 320 mg tablet TAKE 1 TABLET BY MOUTH EVERY DAY 90 Tab 2  
 montelukast (SINGULAIR) 10 mg tablet TAKE 1 TABLET BY MOUTH EVERY DAY 30 Tab 9  
 PROAIR HFA 90 mcg/actuation inhaler INHALE 2 PUFFS BY MOUTH EVERY 6 HOURS AS NEEDED 1 Inhaler 12  
 valsartan (DIOVAN) 320 mg tablet TAKE 1 TABLET BY MOUTH EVERY DAY 90 Tab 3  
 valsartan (DIOVAN) 320 mg tablet TAKE 1 TABLET BY MOUTH EVERY DAY 90 Tab 3  
 amLODIPine-benazepril (LOTREL) 10-20 mg per capsule Take 1 capsule by mouth daily. 90 capsule 1  levofloxacin (LEVAQUIN) 750 mg tablet Take 1 Tab by mouth daily. 10 Tab 0  
 meclizine (ANTIVERT) 25 mg tablet Take 1 Tab by mouth three (3) times daily as needed for Dizziness. 20 Tab 0  
 ondansetron (ZOFRAN ODT) 4 mg disintegrating tablet Take 1 Tab by mouth every eight (8) hours as needed for Nausea. 10 Tab 0  chlorpheniramine-HYDROcodone (TUSSIONEX) 8-10 mg/5 mL suspension TAKE 5 MLS EVERY 12 HOURS AS NEEDED FOR COUGH 60 mL 0  
 albuterol (PROVENTIL VENTOLIN) 2.5 mg /3 mL (0.083 %) nebulizer solution Use 1 vial via nebulizer 1 Package 0  
 acetaminophen-codeine (TYLENOL-CODEINE #3) 300-30 mg per tablet Take 1 Tab by mouth every six (6) hours as needed for Pain. 90 Tab 2  
 albuterol (PROVENTIL HFA, VENTOLIN HFA) 90 mcg/actuation inhaler Take 2 Puffs by inhalation every six (6) hours as needed. 1 Inhaler 0  Bedside Commode XX Used as needed 1 Each 0  
 beclomethasone (QVAR) 40 mcg/actuation inhaler Take 1 Puff by inhalation two (2) times a day. 1 Inhaler 6  Inhalational Spacing Device (SIDESTREAM PEDIATRIC FACE MASK) Kit 1 Units by Does Not Apply route as needed. 1 Units 0 Past History Past Medical History: 
Past Medical History:  
Diagnosis Date  Arthritis  Asthma  Chronic pain   
 knees & hips  Hypertension  Unspecified adverse effect of anesthesia 1st cousin had severe HTN and difficulty breathing  Unspecified sleep apnea   
 does not use c-pap- to be retested Past Surgical History: No past surgical history on file. Family History: 
Family History Problem Relation Age of Onset  Asthma Mother  Hypertension Mother  High Cholesterol Mother  Emphysema Brother  Heart Failure Brother  Colon Cancer Brother  Hypertension Brother  Lung Disease Brother   
     copd  Heart Disease Brother   
     chf  
 Diabetes Maternal Uncle  Hypertension Maternal Uncle  Diabetes Paternal Aunt  Hypertension Paternal Uncle  High Cholesterol Paternal Uncle  Colon Cancer Maternal Grandmother  Hypertension Maternal Grandfather Social History: 
Social History Tobacco Use  Smoking status: Former Smoker  Smokeless tobacco: Never Used  Tobacco comment: quit smoking at age 34 Substance Use Topics  Alcohol use: No  
 Drug use: No  
  Comment: denies Allergies: Allergies Allergen Reactions  Latex Rash and Itching [address]  Ibuprofen Hives  Pcn [Penicillins] Hives  Shellfish Derived Hives  Solu-Medrol W/Diluent Hives  Sulfa Dyne Hives Review of Systems Review of Systems Constitutional: Negative for chills and fever. HENT: Positive for congestion. Negative for ear pain and sore throat. Eyes: Negative for redness and visual disturbance. Respiratory: Positive for cough and shortness of breath. Cardiovascular: Negative for chest pain and palpitations. Gastrointestinal: Negative for abdominal pain, nausea and vomiting. Genitourinary: Negative for dysuria and hematuria. Musculoskeletal: Negative for back pain and gait problem. Skin: Negative for rash and wound. Neurological: Negative for dizziness and headaches. Psychiatric/Behavioral: Negative for behavioral problems and confusion. All other systems reviewed and are negative. Physical Exam  
Physical Exam  
Constitutional: She is oriented to person, place, and time. She appears well-developed and well-nourished. Morbidly obese female who is able to speak in full sentences and is no apparent distress. HENT:  
Head: Normocephalic and atraumatic. Eyes: Pupils are equal, round, and reactive to light. Conjunctivae and EOM are normal.  
Neck: Normal range of motion. Neck supple. Cardiovascular: Normal rate, regular rhythm and normal heart sounds. Pulmonary/Chest: Effort normal. No respiratory distress. She has wheezes (mild expiratory heard in the bases). Musculoskeletal: Normal range of motion. Neurological: She is alert and oriented to person, place, and time. She has normal strength. No cranial nerve deficit or sensory deficit. GCS eye subscore is 4. GCS verbal subscore is 5. GCS motor subscore is 6. Skin: Skin is warm and dry. No rash noted. Psychiatric: She has a normal mood and affect. Her behavior is normal.  
Nursing note and vitals reviewed. Diagnostic Study Results Labs - No results found for this or any previous visit (from the past 12 hour(s)). Radiologic Studies -  
XR CHEST PA LAT Final Result IMPRESSION: No acute intrathoracic disease. CT Results  (Last 48 hours) None CXR Results  (Last 48 hours) 04/02/19 2246  XR CHEST PA LAT Final result Impression:  IMPRESSION: No acute intrathoracic disease. Narrative:  CLINICAL HISTORY: Dyspnea INDICATION: Dyspnea COMPARISON: 8/18/2014 FINDINGS:   
PA and lateral views of the chest are obtained. Examination is somewhat limited secondary to patient habitus. The cardiopericardial silhouette is within normal limits. There is no pleural  
effusion, pneumothorax or focal consolidation present. Medical Decision Making I am the first provider for this patient. I reviewed the vital signs, available nursing notes, past medical history, past surgical history, family history and social history. Vital Signs-Reviewed the patient's vital signs. Patient Vitals for the past 12 hrs: 
 Temp Pulse Resp BP SpO2  
04/02/19 2127 97.9 °F (36.6 °C) 82 20 180/85 100 % Records Reviewed: Nursing Notes and Old Medical Records Provider Notes (Medical Decision Making):  
Differential diagnosis includes asthma exacerbation, bronchitis, pneumonia. Plan for DuoNeb and chest x-ray. ED Course:  
Initial assessment performed.  The patients presenting problems have been discussed, and they are in agreement with the care plan formulated and outlined with them. I have encouraged them to ask questions as they arise throughout their visit. ED Course as of Apr 03 0032 Tue Apr 02, 2019  
2326 Reassessed patient. She feels much better after nebulizer treatment. She is ready for discharge. [TRACIE] ED Course User Index [TRACIE] Nakul Briseno, [address] Disposition: 
11:28 PM 
 
The patient has been re-evaluated and is ready for discharge. Reviewed available results with patient. Counseled patient on diagnosis and care plan. Patient has expressed understanding, and all questions have been answered. Patient agrees with plan and agrees to follow up as recommended, or to return to the ED if their symptoms worsen. Discharge instructions have been provided and explained to the patient, along with reasons to return to the ED. PLAN: 
1. Discharge Medication List as of 4/2/2019 11:30 PM  
  
START taking these medications Details  
predniSONE (DELTASONE) 20 mg tablet Take 40 mg by mouth daily for 4 days. With Breakfast, Normal, Disp-8 Tab, R-0  
  
azithromycin (ZITHROMAX Z-HAMMAD) 250 mg tablet Take 2 tabs on day 1, then 1 tab daily on days 2-5., Normal, Disp-6 Tab, R-0  
  
  
CONTINUE these medications which have NOT CHANGED  Details  
alum-mag hydroxide-simeth (MYLANTA) 200-200-20 mg/5 mL susp Take 30 mL by mouth every four (4) hours as needed., Normal, Disp-30 mL, R-0  
  
!! montelukast (SINGULAIR) 10 mg tablet TAKE 1 TABLET BY MOUTH EVERY DAY, Normal, Disp-30 Tab, R-9  
  
!! valsartan (DIOVAN) 320 mg tablet TAKE 1 TABLET BY MOUTH EVERY DAY, Normal, Disp-90 Tab, R-2  
  
!! valsartan (DIOVAN) 320 mg tablet TAKE 1 TABLET BY MOUTH EVERY DAY, Normal, Disp-90 Tab, R-2  
  
!! montelukast (SINGULAIR) 10 mg tablet TAKE 1 TABLET BY MOUTH EVERY DAY, Normal, Disp-30 Tab, R-9  
  
!! PROAIR HFA 90 mcg/actuation inhaler INHALE 2 PUFFS BY MOUTH EVERY 6 HOURS AS NEEDED, Normal, Disp-1 Inhaler, R-12  
  
!! valsartan (DIOVAN) 320 mg tablet TAKE 1 TABLET BY MOUTH EVERY DAY, Normal, Disp-90 Tab, R-3  
  
!! valsartan (DIOVAN) 320 mg tablet TAKE 1 TABLET BY MOUTH EVERY DAY, Normal, Disp-90 Tab, R-3  
  
amLODIPine-benazepril (LOTREL) 10-20 mg per capsule Take 1 capsule by mouth daily. , Normal, Disp-90 capsule, R-1  
  
levofloxacin (LEVAQUIN) 750 mg tablet Take 1 Tab by mouth daily. , Print, Disp-10 Tab, R-0  
  
meclizine (ANTIVERT) 25 mg tablet Take 1 Tab by mouth three (3) times daily as needed for Dizziness. , Print, Disp-20 Tab, R-0  
  
ondansetron (ZOFRAN ODT) 4 mg disintegrating tablet Take 1 Tab by mouth every eight (8) hours as needed for Nausea. , Print, Disp-10 Tab, R-0  
  
chlorpheniramine-HYDROcodone (TUSSIONEX) 8-10 mg/5 mL suspension TAKE 5 MLS EVERY 12 HOURS AS NEEDED FOR COUGH, Print, Disp-60 mL, R-0  
  
albuterol (PROVENTIL VENTOLIN) 2.5 mg /3 mL (0.083 %) nebulizer solution Use 1 vial via nebulizer, No Print, Disp-1 Package, R-0  
  
acetaminophen-codeine (TYLENOL-CODEINE #3) 300-30 mg per tablet Take 1 Tab by mouth every six (6) hours as needed for Pain., Print, Disp-90 Tab, R-2  
  
!! albuterol (PROVENTIL HFA, VENTOLIN HFA) 90 mcg/actuation inhaler Take 2 Puffs by inhalation every six (6) hours as needed., Normal, Disp-1 Inhaler, R-0 Bedside Commode XX Used as needed, Print, Disp-1 Each, R-0  
  
beclomethasone (QVAR) 40 mcg/actuation inhaler Take 1 Puff by inhalation two (2) times a day. Normal, 1 Puff, Disp-1 Inhaler, R-6 Inhalational Spacing Device (SIDESTREAM PEDIATRIC FACE MASK) Kit 1 Units by Does Not Apply route as needed. Print, 1 Units, Disp-1 Units, R-0  
  
 !! - Potential duplicate medications found. Please discuss with provider. 2.  
Follow-up Information Follow up With Specialties Details Why Contact Info  Summer Atkinson NP Nurse Practitioner Schedule an appointment as soon as possible for a visit in 2 days for a recheck [address] [facility] 
[phone] [facility] EMERGENCY DEPT Emergency Medicine Go to If symptoms worsen [address] [facility] 
[phone] Return to ED if worse Diagnosis Clinical Impression: 1. Mild intermittent asthma with acute exacerbation 2. Acute bronchitis, unspecified organism Alden Whyte. STACI Linton 
 
 
7:37 AM 
I have personally seen and examined the patient, reviewed the OSIRIS's note and agree with findings and plan. Kimberly Main MD 
  
The patient presents with: 66 female with history of asthma presenting with productive cough shortness of breath, and wheezing over the last several days. My exam shows: Diffuse mild expiratory wheezing in all lung fields. No significant respiratory distress or accessory muscle use. Heart sounds are regular rate and rhythm with no murmurs Impression/Plan: Sign of acute bronchitis and asthma exacerbation. We will treat in standard fashion as described above. Will return with worsening symptoms. I have reviewed and agree with the MLP's findings, including all diagnostic interpretations, and plans as written. I was present during the key portions of separately billed procedures.   
Kimberly Main MD

## 2019-04-03 NOTE — DISCHARGE INSTRUCTIONS
Patient Education        Bronchitis: Care Instructions  Your Care Instructions    Bronchitis is inflammation of the bronchial tubes, which carry air to the lungs. The tubes swell and produce mucus, or phlegm. The mucus and inflamed bronchial tubes make you cough. You may have trouble breathing. Most cases of bronchitis are caused by viruses like those that cause colds. Antibiotics usually do not help and they may be harmful. Bronchitis usually develops rapidly and lasts about 2 to 3 weeks in otherwise healthy people. Follow-up care is a key part of your treatment and safety. Be sure to make and go to all appointments, and call your doctor if you are having problems. It's also a good idea to know your test results and keep a list of the medicines you take. How can you care for yourself at home? · Take all medicines exactly as prescribed. Call your doctor if you think you are having a problem with your medicine. · Get some extra rest.  · Take an over-the-counter pain medicine, such as acetaminophen (Tylenol), ibuprofen (Advil, Motrin), or naproxen (Aleve) to reduce fever and relieve body aches. Read and follow all instructions on the label. · Do not take two or more pain medicines at the same time unless the doctor told you to. Many pain medicines have acetaminophen, which is Tylenol. Too much acetaminophen (Tylenol) can be harmful. · Take an over-the-counter cough medicine that contains dextromethorphan to help quiet a dry, hacking cough so that you can sleep. Avoid cough medicines that have more than one active ingredient. Read and follow all instructions on the label. · Breathe moist air from a humidifier, hot shower, or sink filled with hot water. The heat and moisture will thin mucus so you can cough it out. · Do not smoke. Smoking can make bronchitis worse. If you need help quitting, talk to your doctor about stop-smoking programs and medicines.  These can increase your chances of quitting for good.  When should you call for help? Call 911 anytime you think you may need emergency care. For example, call if:    · You have severe trouble breathing.    Call your doctor now or seek immediate medical care if:    · You have new or worse trouble breathing.     · You cough up dark brown or bloody mucus (sputum).     · You have a new or higher fever.     · You have a new rash.    Watch closely for changes in your health, and be sure to contact your doctor if:    · You cough more deeply or more often, especially if you notice more mucus or a change in the color of your mucus.     · You are not getting better as expected. Where can you learn more? Go to http://anne-marie-abdirahman.info/. Enter H333 in the search box to learn more about \"Bronchitis: Care Instructions. \"  Current as of: September 5, 2018  Content Version: 11.9  © 5730-6546 [employer]. Care instructions adapted under license by [employer] (which disclaims liability or warranty for this information). If you have questions about a medical condition or this instruction, always ask your healthcare professional. [address] any warranty or liability for your use of this information.

## 2019-04-03 NOTE — ED PROVIDER NOTES
EMERGENCY DEPARTMENT HISTORY AND PHYSICAL EXAM 
     
 
Date: 4/3/2019 Patient Name: Yin Gaona History of Presenting Illness Chief Complaint Patient presents with  Lip Swelling  
  possible allergic rxn; pt reports swelling started after eating fried chicken [facility] approx 20 min ago; pt denies difficulty breathing History Provided By: Patient and Patient's  HPI: Yin Gaona is a 66 y.o. female, pmhx asthma, bronchitis, who presents via wheelchair to the ED c/o lower lip swelling and tongue tingling since 20 minutes prior to arrival.  Patient ate fried chicken from [employer] which she is never had before. Feels like she is having allergic reaction to this chicken. She denies any difficulty breathing or shortness of breath. Tongue is not swelling. She continues to wheeze from her bronchitis which she was seen for yesterday PCP: Dung Torres, LILY There are no other complaints, changes, or physical findings at this time. Current Facility-Administered Medications Medication Dose Route Frequency Provider Last Rate Last Dose  diphenhydrAMINE (BENADRYL) injection 50 mg  50 mg IntraVENous NOW Alta Noland, DO      
 famotidine (PEPCID) tablet 20 mg  20 mg Oral NOW Alta Noland, DO      
 famotidine (PEPCID) tablet 20 mg  20 mg Oral NOW CONNIE Sin      
 diphenhydrAMINE (BENADRYL) capsule 25 mg  25 mg Oral NOW CONNIE Sin      
 albuterol-ipratropium (DUO-NEB) 2.5 MG-0.5 MG/3 ML  3 mL Nebulization NOW CONNIE Sin Current Outpatient Medications Medication Sig Dispense Refill  predniSONE (DELTASONE) 20 mg tablet Take 40 mg by mouth daily for 4 days. With Breakfast 8 Tab 0  
 azithromycin (ZITHROMAX Z-HAMMAD) 250 mg tablet Take 2 tabs on day 1, then 1 tab daily on days 2-5. 6 Tab 0  
 alum-mag hydroxide-simeth (MYLANTA) 200-200-20 mg/5 mL susp Take 30 mL by mouth every four (4) hours as needed.  30 mL 0  
  montelukast (SINGULAIR) 10 mg tablet TAKE 1 TABLET BY MOUTH EVERY DAY 30 Tab 9  
 valsartan (DIOVAN) 320 mg tablet TAKE 1 TABLET BY MOUTH EVERY DAY 90 Tab 2  
 valsartan (DIOVAN) 320 mg tablet TAKE 1 TABLET BY MOUTH EVERY DAY 90 Tab 2  
 montelukast (SINGULAIR) 10 mg tablet TAKE 1 TABLET BY MOUTH EVERY DAY 30 Tab 9  
 PROAIR HFA 90 mcg/actuation inhaler INHALE 2 PUFFS BY MOUTH EVERY 6 HOURS AS NEEDED 1 Inhaler 12  
 valsartan (DIOVAN) 320 mg tablet TAKE 1 TABLET BY MOUTH EVERY DAY 90 Tab 3  
 valsartan (DIOVAN) 320 mg tablet TAKE 1 TABLET BY MOUTH EVERY DAY 90 Tab 3  
 amLODIPine-benazepril (LOTREL) 10-20 mg per capsule Take 1 capsule by mouth daily. 90 capsule 1  
 levofloxacin (LEVAQUIN) 750 mg tablet Take 1 Tab by mouth daily. 10 Tab 0  
 meclizine (ANTIVERT) 25 mg tablet Take 1 Tab by mouth three (3) times daily as needed for Dizziness. 20 Tab 0  
 ondansetron (ZOFRAN ODT) 4 mg disintegrating tablet Take 1 Tab by mouth every eight (8) hours as needed for Nausea. 10 Tab 0  chlorpheniramine-HYDROcodone (TUSSIONEX) 8-10 mg/5 mL suspension TAKE 5 MLS EVERY 12 HOURS AS NEEDED FOR COUGH 60 mL 0  
 albuterol (PROVENTIL VENTOLIN) 2.5 mg /3 mL (0.083 %) nebulizer solution Use 1 vial via nebulizer 1 Package 0  
 acetaminophen-codeine (TYLENOL-CODEINE #3) 300-30 mg per tablet Take 1 Tab by mouth every six (6) hours as needed for Pain. 90 Tab 2  
 albuterol (PROVENTIL HFA, VENTOLIN HFA) 90 mcg/actuation inhaler Take 2 Puffs by inhalation every six (6) hours as needed. 1 Inhaler 0  Bedside Commode XX Used as needed 1 Each 0  
 beclomethasone (QVAR) 40 mcg/actuation inhaler Take 1 Puff by inhalation two (2) times a day. 1 Inhaler 6  Inhalational Spacing Device (SIDESTREAM PEDIATRIC FACE MASK) Kit 1 Units by Does Not Apply route as needed. 1 Units 0 Past History Past Medical History: 
Past Medical History:  
Diagnosis Date  Arthritis  Asthma  Chronic pain   
 knees & hips  Hypertension  Unspecified adverse effect of anesthesia 1st cousin had severe HTN and difficulty breathing  Unspecified sleep apnea   
 does not use c-pap- to be retested Past Surgical History: No past surgical history on file. Family History: 
Family History Problem Relation Age of Onset  Asthma Mother  Hypertension Mother  High Cholesterol Mother  Emphysema Brother  Heart Failure Brother  Colon Cancer Brother  Hypertension Brother  Lung Disease Brother   
     copd  Heart Disease Brother   
     chf  
 Diabetes Maternal Uncle  Hypertension Maternal Uncle  Diabetes Paternal Aunt  Hypertension Paternal Uncle  High Cholesterol Paternal Uncle  Colon Cancer Maternal Grandmother  Hypertension Maternal Grandfather Social History: 
Social History Tobacco Use  Smoking status: Former Smoker  Smokeless tobacco: Never Used  Tobacco comment: quit smoking at age 34 Substance Use Topics  Alcohol use: No  
 Drug use: No  
  Comment: denies Allergies: Allergies Allergen Reactions  Latex Rash and Itching [address]  Ibuprofen Hives  Pcn [Penicillins] Hives  Shellfish Derived Hives  Solu-Medrol W/Diluent Hives  Sulfa Dyne Hives Review of Systems Review of Systems Constitutional: Negative for activity change, appetite change, fatigue and fever. HENT: Positive for facial swelling. Negative for congestion, dental problem, drooling, ear pain, rhinorrhea, sinus pressure, sore throat and trouble swallowing. Eyes: Negative for photophobia, pain, discharge, redness, itching and visual disturbance. Respiratory: Positive for wheezing. Negative for cough, chest tightness, shortness of breath and stridor. Cardiovascular: Negative for chest pain and leg swelling. Gastrointestinal: Negative for abdominal distention, abdominal pain and blood in stool. Genitourinary: Negative for difficulty urinating, dysuria, flank pain, frequency, vaginal bleeding, vaginal discharge and vaginal pain. Musculoskeletal: Negative for arthralgias, back pain, gait problem, joint swelling and neck pain. Skin: Negative for rash and wound. Neurological: Negative for dizziness, syncope, weakness, numbness and headaches. Psychiatric/Behavioral: Negative for behavioral problems. The patient is not nervous/anxious. Physical Exam  
Physical Exam  
Constitutional: She is oriented to person, place, and time. Vital signs are normal. She appears well-developed and well-nourished. No distress. Obese in a wheelchair HENT:  
Head: Normocephalic and atraumatic. Right Ear: External ear normal.  
Left Ear: External ear normal.  
Nose: Nose normal.  
Mouth/Throat: Oropharynx is clear and moist. No oropharyngeal exudate. Slight swelling/erythema to the lateral lower right lip. Patient able to speak in complete sentences. No swelling of the tongue. Eyes: Pupils are equal, round, and reactive to light. Conjunctivae and EOM are normal. Right eye exhibits no discharge. Left eye exhibits no discharge. No scleral icterus. Neck: Normal range of motion. Neck supple. No tracheal deviation present. Cardiovascular: Normal rate, regular rhythm, normal heart sounds and intact distal pulses. Exam reveals no gallop and no friction rub. No murmur heard. Pulmonary/Chest: Effort normal. No respiratory distress. She has wheezes. She has no rales. She exhibits no tenderness. Musculoskeletal: She exhibits no edema or tenderness. Lymphadenopathy:  
  She has no cervical adenopathy. Neurological: She is alert and oriented to person, place, and time. No cranial nerve deficit. Skin: Skin is warm and dry. No rash noted. No erythema. Psychiatric: She has a normal mood and affect. Her behavior is normal.  
Nursing note and vitals reviewed. Diagnostic Study Results Labs - 
 No results found for this or any previous visit (from the past 12 hour(s)). Radiologic Studies - No orders to display CT Results  (Last 48 hours) None CXR Results  (Last 48 hours) 04/02/19 2246  XR CHEST PA LAT Final result Impression:  IMPRESSION: No acute intrathoracic disease. Narrative:  CLINICAL HISTORY: Dyspnea INDICATION: Dyspnea COMPARISON: 8/18/2014 FINDINGS:   
PA and lateral views of the chest are obtained. Examination is somewhat limited secondary to patient habitus. The cardiopericardial silhouette is within normal limits. There is no pleural  
effusion, pneumothorax or focal consolidation present. Medical Decision Making I am the first provider for this patient. I reviewed the vital signs, available nursing notes, past medical history, past surgical history, family history and social history. Vital Signs-Reviewed the patient's vital signs. Patient Vitals for the past 12 hrs: 
 Temp Pulse Resp BP SpO2  
04/03/19 1812 97.5 °F (36.4 °C) 98 16 154/80 99 % Records Reviewed: Nursing Notes, Old Medical Records and [facility] Provider Notes (Medical Decision Making): DDx: Allergic reaction, anaphylaxis, bronchitis, 
 
ED Course:  
Initial assessment performed. The patients presenting problems have been discussed, and they are in agreement with the care plan formulated and outlined with them. I have encouraged them to ask questions as they arise throughout their visit. PROGRESS NOTE: 
  
 
 
8:26 PM 
Pt noted to be feeling better, ready for discharge. Updated pt and/or family on available findings. Will follow up as instructed. All questions have been answered, pt voiced understanding and agreement with plan. Specific return precautions provided as well as instructions to return to the ED should sx worsen at any time. Vital signs stable for discharge. Patient has an EpiPen at home which she will use if she feels tongue swelling or throat closing up. STACI Gupta Disposition: 
 
DISCHARGE NOTE: 
8:26 PM 
The patient's results have been reviewed with patient and her . They verbally convey their understanding and agreement of the patient's signs, symptoms, diagnosis, treatment, and prognosis. They additionally agree to follow up as recommended in the discharge instructions or to return to the Emergency Room should the patient's condition change prior to their follow-up appointment. The patient verbally agrees with the care-plan and all of their questions have been answered. The discharge instructions have also been provided to the them along with educational information regarding the patient's diagnosis and a list of reasons why the patient would want to return to the ER prior to their follow-up appointment should their condition change. STACI Gupta PLAN: 
1. Current Discharge Medication List  
  
START taking these medications Details  
famotidine (PEPCID) 20 mg tablet Take 1 Tab by mouth two (2) times a day. Qty: 20 Tab, Refills: 0  
  
diphenhydrAMINE (BENADRYL ALLERGY) 25 mg tablet Take 1-2 Tabs by mouth every six (6) hours as needed (allergic reaction). Qty: 30 Tab, Refills: 0  
  
  
 
2. Follow-up Information Follow up With Specialties Details Why Contact Info Marcellus Jackson NP Nurse Practitioner   [address] [place] [address] 
[phone] Aixa Martino MD Allergy  Allergy specialist, As needed Selma Méndez [address] [place] [address] 
[phone] [facility] EMERGENCY DEPT Emergency Medicine  If symptoms worsen [address] [place] [address] [address] [facility] 
[phone] Return to ED if worse Diagnosis Clinical Impression: 1. Allergic reaction to food, initial encounter This note will not be viewable in [address]

## 2019-06-17 ENCOUNTER — HOSPITAL ENCOUNTER (EMERGENCY)
Age: 64
Discharge: HOME | End: 2019-06-17
Attending: EMERGENCY MEDICINE
Payer: MEDICAID

## 2019-06-17 VITALS
BODY MASS INDEX: 51.91 KG/M2 | TEMPERATURE: 98 F | HEART RATE: 89 BPM | DIASTOLIC BLOOD PRESSURE: 89 MMHG | SYSTOLIC BLOOD PRESSURE: 145 MMHG | HEIGHT: 63 IN | RESPIRATION RATE: 19 BRPM | WEIGHT: 293 LBS | OXYGEN SATURATION: 98 %

## 2019-06-17 DIAGNOSIS — R22.0 LIP SWELLING: ICD-10-CM

## 2019-06-17 DIAGNOSIS — T78.40XA ALLERGIC REACTION, INITIAL ENCOUNTER: Primary | ICD-10-CM

## 2019-06-17 PROCEDURE — 99282 EMERGENCY DEPT VISIT SF MDM: CPT

## 2019-06-17 RX ORDER — LOSARTAN POTASSIUM 100 MG/1
100 TABLET ORAL DAILY
COMMUNITY

## 2019-06-17 RX ORDER — PREDNISONE 10 MG/1
TABLET ORAL
Qty: 21 TAB | Refills: 0 | Status: SHIPPED | OUTPATIENT
Start: 2019-06-17

## 2019-06-17 RX ORDER — DIPHENHYDRAMINE HCL 25 MG
25-50 CAPSULE ORAL
Qty: 30 CAP | Refills: 0 | Status: SHIPPED | OUTPATIENT
Start: 2019-06-17

## 2019-06-17 NOTE — ED NOTES
Dr Yazmin Milligan reviewed discharge instructions with the patient. The patient verbalized understanding. All questions and concerns were addressed. The patient in a wheelchair and is discharged in the care of family members with instructions and prescriptions in hand. Pt is alert and oriented x 4. Respirations are clear and unlabored. Pt being referred to an allergist, pt just moved here and does not have a PCP yet.

## 2019-06-17 NOTE — ED NOTES
Pt arrives by wheelcahir with family member c/o lower lip swelling since last night. Pt only taking losartan for BP. A/Ox4. Pt in no apparent distresss. Reports taking benadryl and prednisone PTA. Denies SOB or difficulty swallowing.

## 2019-08-08 NOTE — ED PROVIDER NOTES
EMERGENCY DEPARTMENT HISTORY AND PHYSICAL EXAM      Date: 6/17/2019  Patient Name: Malka Moore    History of Presenting Illness     Chief Complaint   Patient presents with    Lip Swelling     bottomw lip swelling since last night, reports taking benadryl and prednisone PTA at 12pm. denies SOB or difficulty swallowing. denies taking lisiniopril, unsure what might haev caused this reaction    Allergic Reaction       History Provided By: Patient    HPI: Malka Moore, 66 y.o. female  presents to the ED with cc of lower lip swelling. Symptom onset within the past 24 hours. Patient denies any known allergens. She denies any difficulty with swallowing. She does not have any hives. No shortness of breath. She took Benadryl and prednisone prior to arrival.  She is currently not taking any ACE inhibitors. There are no other complaints, changes, or physical findings at this time. PCP: Malu Portillo NP    No current facility-administered medications on file prior to encounter. Current Outpatient Medications on File Prior to Encounter   Medication Sig Dispense Refill    losartan (COZAAR) 100 mg tablet Take 100 mg by mouth daily.  famotidine (PEPCID) 20 mg tablet Take 1 Tab by mouth two (2) times a day. 20 Tab 0    alum-mag hydroxide-simeth (MYLANTA) 200-200-20 mg/5 mL susp Take 30 mL by mouth every four (4) hours as needed. 30 mL 0    montelukast (SINGULAIR) 10 mg tablet TAKE 1 TABLET BY MOUTH EVERY DAY 30 Tab 9    montelukast (SINGULAIR) 10 mg tablet TAKE 1 TABLET BY MOUTH EVERY DAY 30 Tab 9    PROAIR HFA 90 mcg/actuation inhaler INHALE 2 PUFFS BY MOUTH EVERY 6 HOURS AS NEEDED 1 Inhaler 12    meclizine (ANTIVERT) 25 mg tablet Take 1 Tab by mouth three (3) times daily as needed for Dizziness.  20 Tab 0    chlorpheniramine-HYDROcodone (TUSSIONEX) 8-10 mg/5 mL suspension TAKE 5 MLS EVERY 12 HOURS AS NEEDED FOR COUGH 60 mL 0    albuterol (PROVENTIL VENTOLIN) 2.5 mg /3 mL (0.083 %) nebulizer solution Use 1 vial via nebulizer 1 Package 0    acetaminophen-codeine (TYLENOL-CODEINE #3) 300-30 mg per tablet Take 1 Tab by mouth every six (6) hours as needed for Pain. 90 Tab 2    albuterol (PROVENTIL HFA, VENTOLIN HFA) 90 mcg/actuation inhaler Take 2 Puffs by inhalation every six (6) hours as needed. 1 Inhaler 0    Bedside Commode XX Used as needed 1 Each 0    beclomethasone (QVAR) 40 mcg/actuation inhaler Take 1 Puff by inhalation two (2) times a day. 1 Inhaler 6    Inhalational Spacing Device (SIDESTREAM PEDIATRIC FACE MASK) Kit 1 Units by Does Not Apply route as needed. 1 Units 0       Past History     Past Medical History:  Past Medical History:   Diagnosis Date    Arthritis     Asthma     Chronic pain     knees & hips    Hypertension     Unspecified adverse effect of anesthesia     1st cousin had severe HTN and difficulty breathing    Unspecified sleep apnea     does not use c-pap- to be retested       Past Surgical History:  History reviewed. No pertinent surgical history. Family History:  Family History   Problem Relation Age of Onset    Asthma Mother     Hypertension Mother     High Cholesterol Mother     Emphysema Brother     Heart Failure Brother     Colon Cancer Brother     Hypertension Brother     Lung Disease Brother         copd     Heart Disease Brother         chf    Diabetes Maternal Uncle     Hypertension Maternal Uncle     Diabetes Paternal Aunt     Colon Cancer Maternal Grandmother     Hypertension Maternal Grandfather     Hypertension Paternal Uncle     High Cholesterol Paternal Uncle        Social History:  Social History     Tobacco Use    Smoking status: Former Smoker    Smokeless tobacco: Never Used    Tobacco comment: quit smoking at age 34   Substance Use Topics    Alcohol use: No    Drug use: No     Comment: denies        Allergies:   Allergies   Allergen Reactions    Latex Rash and Itching    Fish Containing Products Hives    Ibuprofen Hives    Pcn [Penicillins] Hives    Shellfish Derived Hives    Solu-Medrol W/Diluent Hives    Sulfa Dyne Hives         Review of Systems   Review of Systems   Constitutional: Negative for chills and fever. HENT: Negative for congestion, ear pain, rhinorrhea, sore throat and trouble swallowing. Lower lip swelling   Eyes: Negative for visual disturbance. Respiratory: Negative for cough, chest tightness and shortness of breath. Cardiovascular: Negative for chest pain and palpitations. Gastrointestinal: Negative for abdominal pain, blood in stool, constipation, diarrhea, nausea and vomiting. Genitourinary: Negative for decreased urine volume, difficulty urinating, dysuria and frequency. Musculoskeletal: Negative for back pain and neck pain. Skin: Negative for color change and rash. Neurological: Negative for dizziness, weakness, light-headedness and headaches. Physical Exam   Physical Exam   Constitutional: She is oriented to person, place, and time. She appears well-developed and well-nourished. She does not appear ill. No distress. HENT:   Mouth/Throat: Oropharynx is clear and moist.   Mild lower lip swelling   Eyes: Conjunctivae are normal.   Neck: Neck supple. Cardiovascular: Normal rate and regular rhythm. Pulmonary/Chest: Effort normal and breath sounds normal. No accessory muscle usage. No respiratory distress. Abdominal: Soft. She exhibits no distension. There is no tenderness. Lymphadenopathy:     She has no cervical adenopathy. Neurological: She is alert and oriented to person, place, and time. She has normal strength. No cranial nerve deficit or sensory deficit. Skin: Skin is warm and dry. Nursing note and vitals reviewed. Diagnostic Study Results     Labs -   No results found for this or any previous visit (from the past 24 hour(s)).     Radiologic Studies -   No orders to display     CT Results  (Last 48 hours)    None        CXR Results  (Last 48 hours)    None Medical Decision Making   I am the first provider for this patient. I reviewed the vital signs, available nursing notes, past medical history, past surgical history, family history and social history. Vital Signs-Reviewed the patient's vital signs. 06/17/19 1256 98 °F (36.7 °C) Oral 89 19 -- 145/89 108 -- 98 % 5' 3\" (1.6 m) 173.7 kg (383 lb) Room air         Records Reviewed: Nursing Notes and Old Medical Records    Provider Notes (Medical Decision Making):   No multisystem symptoms to suggest anaphylaxis. Unknown allergen. No new foods or medications. No airway compromise or impending compromise. ED Course:   Initial assessment performed. The patients presenting problems have been discussed, and they are in agreement with the care plan formulated and outlined with them. I have encouraged them to ask questions as they arise throughout their visit. Orders Placed This Encounter    losartan (COZAAR) 100 mg tablet    diphenhydrAMINE (BENADRYL) 25 mg capsule    predniSONE (STERAPRED DS) 10 mg dose pack         Critical Care Time:   0    Disposition:  Discharge    PLAN:  1. Discharge Medication List as of 6/17/2019  2:14 PM      START taking these medications    Details   diphenhydrAMINE (BENADRYL) 25 mg capsule Take 1-2 Caps by mouth every six (6) hours as needed for Itching (allergic reaction). , Normal, Disp-30 Cap, R-0      predniSONE (STERAPRED DS) 10 mg dose pack See administration instructions, Normal, Disp-21 Tab, R-0         CONTINUE these medications which have NOT CHANGED    Details   losartan (COZAAR) 100 mg tablet Take 100 mg by mouth daily. , Historical Med      famotidine (PEPCID) 20 mg tablet Take 1 Tab by mouth two (2) times a day., Normal, Disp-20 Tab, R-0      alum-mag hydroxide-simeth (MYLANTA) 200-200-20 mg/5 mL susp Take 30 mL by mouth every four (4) hours as needed., Normal, Disp-30 mL, R-0      !! montelukast (SINGULAIR) 10 mg tablet TAKE 1 TABLET BY MOUTH EVERY DAY, Normal, Disp-30 Tab, R-9      !! montelukast (SINGULAIR) 10 mg tablet TAKE 1 TABLET BY MOUTH EVERY DAY, Normal, Disp-30 Tab, R-9      !! PROAIR HFA 90 mcg/actuation inhaler INHALE 2 PUFFS BY MOUTH EVERY 6 HOURS AS NEEDED, Normal, Disp-1 Inhaler, R-12      meclizine (ANTIVERT) 25 mg tablet Take 1 Tab by mouth three (3) times daily as needed for Dizziness. , Print, Disp-20 Tab, R-0      chlorpheniramine-HYDROcodone (TUSSIONEX) 8-10 mg/5 mL suspension TAKE 5 MLS EVERY 12 HOURS AS NEEDED FOR COUGH, Print, Disp-60 mL, R-0      albuterol (PROVENTIL VENTOLIN) 2.5 mg /3 mL (0.083 %) nebulizer solution Use 1 vial via nebulizer, No Print, Disp-1 Package, R-0      acetaminophen-codeine (TYLENOL-CODEINE #3) 300-30 mg per tablet Take 1 Tab by mouth every six (6) hours as needed for Pain., Print, Disp-90 Tab, R-2      !! albuterol (PROVENTIL HFA, VENTOLIN HFA) 90 mcg/actuation inhaler Take 2 Puffs by inhalation every six (6) hours as needed., Normal, Disp-1 Inhaler, R-0      Bedside Commode XX Used as needed, Print, Disp-1 Each, R-0      beclomethasone (QVAR) 40 mcg/actuation inhaler Take 1 Puff by inhalation two (2) times a day. Normal, 1 Puff, Disp-1 Inhaler, R-6      Inhalational Spacing Device (SIDESTREAM PEDIATRIC FACE MASK) Kit 1 Units by Does Not Apply route as needed. Print, 1 Units, Disp-1 Units, R-0       !! - Potential duplicate medications found. Please discuss with provider. 2.   Follow-up Information     Follow up With Specialties Details Why Contact Info    Ana Hassan MD Allergy Schedule an appointment as soon as possible for a visit  As needed  [address] [phone]          Return to ED if worse     Diagnosis     Clinical Impression:   1. Allergic reaction, initial encounter    2. Lip swelling            This note will not be viewable in MyChart.